# Patient Record
Sex: FEMALE | Race: WHITE | NOT HISPANIC OR LATINO | URBAN - METROPOLITAN AREA
[De-identification: names, ages, dates, MRNs, and addresses within clinical notes are randomized per-mention and may not be internally consistent; named-entity substitution may affect disease eponyms.]

---

## 2021-11-19 ENCOUNTER — EMERGENCY (EMERGENCY)
Facility: HOSPITAL | Age: 61
LOS: 1 days | Discharge: HOME | End: 2021-11-19
Attending: EMERGENCY MEDICINE | Admitting: EMERGENCY MEDICINE
Payer: COMMERCIAL

## 2021-11-19 VITALS
RESPIRATION RATE: 18 BRPM | HEART RATE: 108 BPM | OXYGEN SATURATION: 98 % | HEIGHT: 69 IN | DIASTOLIC BLOOD PRESSURE: 98 MMHG | WEIGHT: 139.99 LBS | TEMPERATURE: 98 F | SYSTOLIC BLOOD PRESSURE: 161 MMHG

## 2021-11-19 DIAGNOSIS — Z86.000 PERSONAL HISTORY OF IN-SITU NEOPLASM OF BREAST: ICD-10-CM
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
## 2021-11-19 DIAGNOSIS — S52.571A OTHER INTRAARTICULAR FRACTURE OF LOWER END OF RIGHT RADIUS, INITIAL ENCOUNTER FOR CLOSED FRACTURE: ICD-10-CM

## 2021-11-19 DIAGNOSIS — M25.531 PAIN IN RIGHT WRIST: ICD-10-CM

## 2021-11-19 DIAGNOSIS — S52.611A DISPLACED FRACTURE OF RIGHT ULNA STYLOID PROCESS, INITIAL ENCOUNTER FOR CLOSED FRACTURE: ICD-10-CM
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
## 2021-11-19 DIAGNOSIS — I10 ESSENTIAL (PRIMARY) HYPERTENSION: ICD-10-CM

## 2021-11-19 DIAGNOSIS — Z90.11 ACQUIRED ABSENCE OF RIGHT BREAST AND NIPPLE: ICD-10-CM

## 2021-11-19 PROCEDURE — 73110 X-RAY EXAM OF WRIST: CPT | Mod: 26,RT

## 2021-11-19 PROCEDURE — 25600 CLTX DST RDL FX/EPHYS SEP WO: CPT | Mod: 54

## 2021-11-19 PROCEDURE — 99284 EMERGENCY DEPT VISIT MOD MDM: CPT | Mod: 57

## 2021-11-19 PROCEDURE — 73130 X-RAY EXAM OF HAND: CPT | Mod: 26,RT

## 2021-11-19 RX ORDER — IBUPROFEN 200 MG
600 TABLET ORAL ONCE
Refills: 0 | Status: COMPLETED | OUTPATIENT
Start: 2021-11-19 | End: 2021-11-19

## 2021-11-19 RX ORDER — ANASTROZOLE 1 MG/1
0 TABLET ORAL
Qty: 0 | Refills: 0 | DISCHARGE

## 2021-11-19 RX ORDER — MORPHINE SULFATE 50 MG/1
2 CAPSULE, EXTENDED RELEASE ORAL ONCE
Refills: 0 | Status: DISCONTINUED | OUTPATIENT
Start: 2021-11-19 | End: 2021-11-19

## 2021-11-19 RX ORDER — MORPHINE SULFATE 50 MG/1
4 CAPSULE, EXTENDED RELEASE ORAL ONCE
Refills: 0 | Status: DISCONTINUED | OUTPATIENT
Start: 2021-11-19 | End: 2021-11-19

## 2021-11-19 RX ORDER — OXYCODONE AND ACETAMINOPHEN 5; 325 MG/1; MG/1
1 TABLET ORAL ONCE
Refills: 0 | Status: DISCONTINUED | OUTPATIENT
Start: 2021-11-19 | End: 2021-11-19

## 2021-11-19 RX ORDER — LOSARTAN POTASSIUM 100 MG/1
0 TABLET, FILM COATED ORAL
Qty: 0 | Refills: 0 | DISCHARGE

## 2021-11-19 RX ADMIN — MORPHINE SULFATE 2 MILLIGRAM(S): 50 CAPSULE, EXTENDED RELEASE ORAL at 23:29

## 2021-11-19 RX ADMIN — Medication 600 MILLIGRAM(S): at 22:08

## 2021-11-19 NOTE — ED PROVIDER NOTE - CLINICAL SUMMARY MEDICAL DECISION MAKING FREE TEXT BOX
injury sp trauma, no vascular compromise, xray, analgesia injury sp trauma, no vascular compromise, xray, analgesia    rings were removed

## 2021-11-19 NOTE — ED PROVIDER NOTE - CARE PLAN
1 Principal Discharge DX:	Other closed intra-articular fracture of distal end of right radius  Secondary Diagnosis:	Fracture of right ulnar styloid

## 2021-11-19 NOTE — ED ADULT TRIAGE NOTE - CHIEF COMPLAINT QUOTE
Pt walked into ER c/o right wrist pain after falling backwards at home/tripped over bed. Pt denies LOC or further injury. Noted deformity to right wrist, +PMS intact.

## 2021-11-19 NOTE — ED PROVIDER NOTE - PATIENT PORTAL LINK FT
You can access the FollowMyHealth Patient Portal offered by [facility] by registering at the following website: [email or web address]. By joining [facility]’s FollowMyHealth portal, you will also be able to view your health information using other applications (apps) compatible with our system.

## 2021-11-19 NOTE — ED PROVIDER NOTE - NSFOLLOWUPINSTRUCTIONS_ED_ALL_ED_FT
Follow up with our orthopedist  Alternate tylenol/motrin if you do not have any allergies/intolerance   You can take 600mg of motrin every 6 hours with food as needed  You can take 1000mg of tylenol every 6 hours as needed   For severe pain, take tramadol   Tramadol can cause drowsiness and constipation  Take colace to minimize constipation when taking tramadol  Keep it elevated to reduce swelling   Return immediately for any new or worsening symptoms or any new concerns

## 2021-11-19 NOTE — ED PROVIDER NOTE - CARE PROVIDER_API CALL
Stalin Maxwell)  Orthopaedic Surgery  [address]  [place], [place] [address]  Phone: [phone]  Fax: [phone]  Follow Up Time:

## 2021-11-19 NOTE — ED ADULT NURSE NOTE - NSICDXPASTMEDICALHX_GEN_ALL_CORE_FT
PAST MEDICAL HISTORY:  DCIS (ductal carcinoma in situ)     H/O right mastectomy     HTN (hypertension)

## 2021-11-19 NOTE — ED PROVIDER NOTE - PROGRESS NOTE DETAILS
xray and case reviewed w/ orthopedics on call, rec'd sugartong splint, will have office reach out Monday to plan follow up

## 2021-11-19 NOTE — ED PROVIDER NOTE - PHYSICAL EXAMINATION
Physical Exam  GEN: Awake, alert, non-toxic appearing,  EYES: full EOMI,  ENT: External inspection normal, normal voice,   HEAD: atraumatic  NECK: FROM neck, supple,   RESP: no tachypnea, no hypoxia, no resp distress,  MSK: deformity to R wrist, pain w/ ROM, no tenderness/deformity to elbow/forearm/metacarpal/phalanges, several rings to R 3rd and 4th digits  SKIN: no laceration/abrasion

## 2021-11-20 PROCEDURE — 73100 X-RAY EXAM OF WRIST: CPT | Mod: 26,RT

## 2021-11-20 RX ORDER — DOCUSATE SODIUM 100 MG
1 CAPSULE ORAL
Qty: 21 | Refills: 0
Start: 2021-11-20 | End: 2021-11-26

## 2021-11-20 RX ORDER — TRAMADOL HYDROCHLORIDE 50 MG/1
50 TABLET ORAL ONCE
Refills: 0 | Status: DISCONTINUED | OUTPATIENT
Start: 2021-11-20 | End: 2021-11-20

## 2021-11-20 RX ORDER — TRAMADOL HYDROCHLORIDE 50 MG/1
0.5 TABLET ORAL
Qty: 5 | Refills: 0
Start: 2021-11-20 | End: 2021-11-29

## 2021-11-20 RX ORDER — ACETAMINOPHEN 500 MG
975 TABLET ORAL ONCE
Refills: 0 | Status: COMPLETED | OUTPATIENT
Start: 2021-11-20 | End: 2021-11-20

## 2021-11-20 RX ADMIN — TRAMADOL HYDROCHLORIDE 50 MILLIGRAM(S): 50 TABLET ORAL at 00:23

## 2021-11-20 RX ADMIN — Medication 975 MILLIGRAM(S): at 00:24

## 2023-03-29 NOTE — ED ADULT NURSE NOTE - CAS EDN DISCHARGE ASSESSMENT
Rx sent  PDMP reviewed; no aberrant behavior identified, prescription authorized.     Alert and oriented to person, place and time

## 2024-01-19 NOTE — ED ADULT NURSE NOTE - NSFALLRSKPSTHSTOCCUR_ED_ALL_ED
From: Lisa Llamas  To: Myra Carr  Sent: 1/19/2024 10:00 AM CST  Subject: Return of Cough    Hello Dr. Carr, As per my medical records I have twice been treated for a respiratory infection. The last visit to urgent care Rx of Augmentin worked well until 5 days ago. My cough had pretty much disappeared, but now back again for past 3 -4 days. I do believe it’s triggered by post-nasal drip. The drip is sporadic and when it comes it’s a lot of tenacious discharge which then causes coughing and at least this time no vomiting. I started taking fluonase 1 spray 2x daily yesterday and also continuing to use Mucinex pan, throat lozenges, warm tea etc. No fever or other symptoms. Coughing is interrupting my sleep and draining. I am willing to do what is needed to at least sleep, but the large amount of PND though clear in nature is not something I have had before. I struggle with OTC antihistamines as they ALL make me quite day drowsy. I am also taking my D-hist (natural supplement). On occasion I do use the inhaler, but the response to that is not as significant as   it was when first prescribed. So as a last resort, wondering if you have any other suggestions to ease this up even if just graduall. We are scheduled to leave town on Sunday until the end of February and I would like to have some relief of this during the time we are gone. I don’t mind trying things and don’t know that antibiotics as needed. Any input is appreciated. Thank you. Lisa Llamas   Single Mechanical/Accidental Fall

## 2024-02-29 PROBLEM — Z90.11 ACQUIRED ABSENCE OF RIGHT BREAST AND NIPPLE: Chronic | Status: ACTIVE | Noted: 2021-11-19

## 2024-02-29 PROBLEM — I10 ESSENTIAL (PRIMARY) HYPERTENSION: Chronic | Status: ACTIVE | Noted: 2021-11-19

## 2024-02-29 PROBLEM — D05.10 INTRADUCTAL CARCINOMA IN SITU OF UNSPECIFIED BREAST: Chronic | Status: ACTIVE | Noted: 2021-11-19

## 2024-03-07 PROBLEM — Z78.9 SOCIAL ALCOHOL USE: Status: ACTIVE | Noted: 2024-03-07

## 2024-03-07 PROBLEM — Z00.00 ENCOUNTER FOR PREVENTIVE HEALTH EXAMINATION: Status: ACTIVE | Noted: 2024-03-07

## 2024-03-07 PROBLEM — Z86.000 HISTORY OF DUCTAL CARCINOMA IN SITU (DCIS) OF BREAST: Status: RESOLVED | Noted: 2024-03-07 | Resolved: 2024-03-07

## 2024-03-08 ENCOUNTER — NON-APPOINTMENT (OUTPATIENT)
Age: 64
End: 2024-03-08

## 2024-03-08 DIAGNOSIS — Z78.9 OTHER SPECIFIED HEALTH STATUS: ICD-10-CM

## 2024-03-08 DIAGNOSIS — Z86.000 PERSONAL HISTORY OF IN-SITU NEOPLASM OF BREAST: ICD-10-CM

## 2024-04-02 ENCOUNTER — APPOINTMENT (OUTPATIENT)
Dept: HEMATOLOGY ONCOLOGY | Facility: CLINIC | Age: 64
End: 2024-04-02
Payer: COMMERCIAL

## 2024-04-02 ENCOUNTER — NON-APPOINTMENT (OUTPATIENT)
Age: 64
End: 2024-04-02

## 2024-04-02 VITALS
SYSTOLIC BLOOD PRESSURE: 138 MMHG | WEIGHT: 142 LBS | OXYGEN SATURATION: 99 % | RESPIRATION RATE: 18 BRPM | HEART RATE: 96 BPM | BODY MASS INDEX: 21.27 KG/M2 | HEIGHT: 68.5 IN | DIASTOLIC BLOOD PRESSURE: 87 MMHG | TEMPERATURE: 98.2 F

## 2024-04-02 DIAGNOSIS — Z80.52 FAMILY HISTORY OF MALIGNANT NEOPLASM OF BLADDER: ICD-10-CM

## 2024-04-02 DIAGNOSIS — Z85.3 PERSONAL HISTORY OF MALIGNANT NEOPLASM OF BREAST: ICD-10-CM

## 2024-04-02 DIAGNOSIS — Z86.79 PERSONAL HISTORY OF OTHER DISEASES OF THE CIRCULATORY SYSTEM: ICD-10-CM

## 2024-04-02 DIAGNOSIS — Z80.0 FAMILY HISTORY OF MALIGNANT NEOPLASM OF DIGESTIVE ORGANS: ICD-10-CM

## 2024-04-02 DIAGNOSIS — M81.0 AGE-RELATED OSTEOPOROSIS W/OUT CURRENT PATHOLOGICAL FRACTURE: ICD-10-CM

## 2024-04-02 PROCEDURE — 99214 OFFICE O/P EST MOD 30 MIN: CPT

## 2024-04-02 RX ORDER — BLOOD SUGAR DIAGNOSTIC
300 STRIP MISCELLANEOUS DAILY
Refills: 0 | Status: ACTIVE | COMMUNITY
Start: 2024-04-02

## 2024-04-02 RX ORDER — OLMESARTAN MEDOXOMIL 20 MG/1
20 TABLET, FILM COATED ORAL DAILY
Refills: 0 | Status: ACTIVE | COMMUNITY
Start: 2024-04-02

## 2024-04-02 RX ORDER — MULTIVIT-MIN/IRON/FOLIC ACID/K 18-600-40
50 MCG CAPSULE ORAL DAILY
Refills: 0 | Status: ACTIVE | COMMUNITY
Start: 2024-04-02

## 2024-04-02 NOTE — OB HISTORY
[Definite:  ___ (Date)] : the last menstrual period was [unfilled] [Menarche Age: ____] : age at menarche was [unfilled] [___] : Total Pregnancies: [unfilled]

## 2024-04-02 NOTE — PHYSICAL EXAM
[Fully active, able to carry on all pre-disease performance without restriction] : Status 0 - Fully active, able to carry on all pre-disease performance without restriction [Normal] : affect appropriate [de-identified] : s/p Rt LN [de-identified] : S/p Rt mast with implan, L unremar S/p Rt mast with implant, Lt unremarkable

## 2024-04-02 NOTE — PHYSICAL EXAM
[Fully active, able to carry on all pre-disease performance without restriction] : Status 0 - Fully active, able to carry on all pre-disease performance without restriction [Normal] : affect appropriate [de-identified] : s/p Rt LN [de-identified] : S/p Rt mast with implan, L unremar S/p Rt mast with implant, Lt unremarkable

## 2024-04-02 NOTE — ASSESSMENT
[FreeTextEntry1] : My Risk neg Hx  mulifocal DICS at age 48, S/P WLE and RT and 5Y Tamoxifen,  recurrence 9/16 with finding of 2 mm focus of moderately differentiated IDC, ER pos and NJ pos, and HER-2 pos, and SLN neg.  In light of small focus of invasion, anti-HER-2 driven therapy does not appear to be warranted. S/P anastrazole 3/17-1/23  LMMG/ bilat US 7/24. F/U BMD Reviewed diet and exercise. F/U 6 months. F/u Dr Frankel

## 2024-04-02 NOTE — BEGINNING OF VISIT
[PHQ-2 Negative] : PHQ-2 Negative [0] : 2) Feeling down, depressed, or hopeless: Not at all (0) [PHQ-9 Negative] : PHQ-9 Negative [Date Discussed (MM/DD/YY): ___] :  Discussed: [unfilled] [With Patient/Caregiver] : with Patient/Caregiver [Abdominal Pain] : no abdominal pain [Vomiting] : no vomiting [Constipation] : no constipation

## 2024-04-02 NOTE — ASSESSMENT
[FreeTextEntry1] : My Risk neg Hx  mulifocal DICS at age 48, S/P WLE and RT and 5Y Tamoxifen,  recurrence 9/16 with finding of 2 mm focus of moderately differentiated IDC, ER pos and NM pos, and HER-2 pos, and SLN neg.  In light of small focus of invasion, anti-HER-2 driven therapy does not appear to be warranted. S/P anastrazole 3/17-1/23  LMMG/ bilat US 7/24. F/U BMD Reviewed diet and exercise. F/U 6 months. F/u Dr Frankel

## 2024-04-02 NOTE — HISTORY OF PRESENT ILLNESS
[de-identified] : 10/24/08, age 48, WLE for DCIS, intermediate nuclear grade with associated calcifications extensively throughout the excision and close to multiple margins, re-excision 11/14/08 revealed multiple foci of DCIS similar to previously seen with final margins negative, ER and NH positive, proliferation < 10% and HER-2 3+.  Postoperatively received  radiation and had been on Tamoxifen for 5Y, completed in May 2014.  LMP 10/14.  Mammography on 5/17/16 revealed inner quadrant new calcifications compared to previous 5/8/15.  Biopsy on 5/23/16 revealed DCIS, cribriform type with intermediate nuclear grade necrosis and calcifications and previous surgical site changes present, ER > 50%, NH  1%, but  10%, and Her2 3+.  On 6/6/16, MRI breast revealed rim enhancement within the retroareolar anterior breast as well as an additional area posterior in the central far posterior depth spanning 1 cm, left breast UIQ unique 3 mm enhancing focus,and follow up in 6 months advised.  On 9/6/16, right nipple sparing mastectomy revealed IDC 2 mm grade 2 (3,2,1) associated with calcifications, 0.2 cm single focus retroareolar, DCIS cribriform type with intermediate nuclear grade and calcifications, present in retroareolar area, no carcinoma seen at margins, DCIS present less than 1 mm from the inked nipple margin and posterior margin, ER > 50%, NH > 50%, proliferation  10% and HER-2 3+.  12/13/16 R SLN and exchange expander for implant and abd fat transfer - one SLN neg for mets.   Anastrazole 3/17-1/23  ALLERGIES: Penicillin.  SOCIAL HX: Rare EtOh, Yoga daily, non smoker.  . Fam hx : My Risk neg [place]  descent. Father had pancreatic cancer at age 69 and her mother had bladder cancer at age 83.  Of note, the patient is from a family of few women.    PAST MEDICAL HISTORY: Menarche 12, LMP 10/2014 Gyn 1/24, OCP briefly in distant past; tonsillectomy, childhood; colonoscopy 5 years ago, BMD 12/22 osteopoorosis, Hx HTN.  [de-identified] : 4/2/24 Last GYN appt: January Last colonoscopy: February 2024 Lt mmg, bilat US 7/23 - [facility]ITT BMD 12/22 - osteoporosis No new c/o

## 2024-04-02 NOTE — BEGINNING OF VISIT
[0] : 2) Feeling down, depressed, or hopeless: Not at all (0) [PHQ-2 Negative] : PHQ-2 Negative [PHQ-9 Negative] : PHQ-9 Negative [Date Discussed (MM/DD/YY): ___] :  Discussed: [unfilled] [With Patient/Caregiver] : with Patient/Caregiver [Abdominal Pain] : no abdominal pain [Vomiting] : no vomiting [Constipation] : no constipation

## 2024-10-08 ENCOUNTER — APPOINTMENT (OUTPATIENT)
Dept: HEMATOLOGY ONCOLOGY | Facility: CLINIC | Age: 64
End: 2024-10-08
Payer: COMMERCIAL

## 2024-10-08 VITALS
SYSTOLIC BLOOD PRESSURE: 155 MMHG | WEIGHT: 140 LBS | BODY MASS INDEX: 21.22 KG/M2 | TEMPERATURE: 98.1 F | HEIGHT: 68 IN | DIASTOLIC BLOOD PRESSURE: 92 MMHG | OXYGEN SATURATION: 99 % | HEART RATE: 82 BPM | RESPIRATION RATE: 18 BRPM

## 2024-10-08 DIAGNOSIS — M81.0 AGE-RELATED OSTEOPOROSIS W/OUT CURRENT PATHOLOGICAL FRACTURE: ICD-10-CM

## 2024-10-08 DIAGNOSIS — C50.911 MALIGNANT NEOPLASM OF UNSPECIFIED SITE OF RIGHT FEMALE BREAST: ICD-10-CM

## 2024-10-08 DIAGNOSIS — Z79.811 LONG TERM (CURRENT) USE OF AROMATASE INHIBITORS: ICD-10-CM

## 2024-10-08 DIAGNOSIS — I10 ESSENTIAL (PRIMARY) HYPERTENSION: ICD-10-CM

## 2024-10-08 DIAGNOSIS — Z17.0 MALIGNANT NEOPLASM OF UNSPECIFIED SITE OF RIGHT FEMALE BREAST: ICD-10-CM

## 2024-10-08 PROCEDURE — 99214 OFFICE O/P EST MOD 30 MIN: CPT

## 2024-10-08 PROCEDURE — G2211 COMPLEX E/M VISIT ADD ON: CPT | Mod: NC

## 2025-04-08 ENCOUNTER — APPOINTMENT (OUTPATIENT)
Dept: HEMATOLOGY ONCOLOGY | Facility: CLINIC | Age: 65
End: 2025-04-08
Payer: COMMERCIAL

## 2025-04-08 VITALS
WEIGHT: 140 LBS | OXYGEN SATURATION: 99 % | HEIGHT: 68 IN | BODY MASS INDEX: 21.22 KG/M2 | HEART RATE: 83 BPM | TEMPERATURE: 97.9 F | RESPIRATION RATE: 18 BRPM | SYSTOLIC BLOOD PRESSURE: 165 MMHG | DIASTOLIC BLOOD PRESSURE: 92 MMHG

## 2025-04-08 DIAGNOSIS — M81.0 AGE-RELATED OSTEOPOROSIS W/OUT CURRENT PATHOLOGICAL FRACTURE: ICD-10-CM

## 2025-04-08 DIAGNOSIS — I10 ESSENTIAL (PRIMARY) HYPERTENSION: ICD-10-CM

## 2025-04-08 DIAGNOSIS — L43.9 LICHEN PLANUS, UNSPECIFIED: ICD-10-CM

## 2025-04-08 DIAGNOSIS — Z79.811 LONG TERM (CURRENT) USE OF AROMATASE INHIBITORS: ICD-10-CM

## 2025-04-08 DIAGNOSIS — C50.911 MALIGNANT NEOPLASM OF UNSPECIFIED SITE OF RIGHT FEMALE BREAST: ICD-10-CM

## 2025-04-08 DIAGNOSIS — Z17.0 MALIGNANT NEOPLASM OF UNSPECIFIED SITE OF RIGHT FEMALE BREAST: ICD-10-CM

## 2025-04-08 PROCEDURE — 99214 OFFICE O/P EST MOD 30 MIN: CPT

## 2025-04-08 PROCEDURE — G2211 COMPLEX E/M VISIT ADD ON: CPT | Mod: NC

## 2025-04-08 RX ORDER — AMLODIPINE BESYLATE 5 MG/1
5 TABLET ORAL
Refills: 0 | Status: ACTIVE | COMMUNITY
Start: 2025-04-08